# Patient Record
Sex: MALE | Race: WHITE | NOT HISPANIC OR LATINO | Employment: OTHER | URBAN - METROPOLITAN AREA
[De-identification: names, ages, dates, MRNs, and addresses within clinical notes are randomized per-mention and may not be internally consistent; named-entity substitution may affect disease eponyms.]

---

## 2024-01-25 ENCOUNTER — TELEPHONE (OUTPATIENT)
Dept: GASTROENTEROLOGY | Facility: CLINIC | Age: 54
End: 2024-01-25
Payer: COMMERCIAL

## 2024-01-25 NOTE — TELEPHONE ENCOUNTER
----- Message from Yulissa Persaud sent at 1/25/2024  3:53 PM CST -----  Contact: Patient  Patient calling to get appointment for consultation. Patient has a history of reflux and is having issues. Was a previous patient with Dr. Bailey. Matches [facility] call [phone]

## 2024-01-25 NOTE — TELEPHONE ENCOUNTER
Staff returned pt call.. pt advised he's never seen Dr Garcia before and want to make appt.  Staff advised her next available is in Nov 2024 and he will need a referral from his PCP.  He advised he will get w/his PCP

## 2024-02-09 DIAGNOSIS — Z12.11 SCREENING FOR COLON CANCER: Primary | ICD-10-CM

## 2024-03-01 ENCOUNTER — TELEPHONE (OUTPATIENT)
Dept: GASTROENTEROLOGY | Facility: CLINIC | Age: 54
End: 2024-03-01
Payer: COMMERCIAL

## 2024-03-01 NOTE — TELEPHONE ENCOUNTER
----- Message from Calli Guerrier sent at 3/1/2024 11:46 AM CST -----  Type: Appointment Request     Name of Caller: SAMANTHA JOSEPH [[id number]]    When is the first available appointment? Do not have access    Reason for Visit:  Referral scanned in media     Best Call Back Number: [phone]    Additional Information:

## 2024-03-06 ENCOUNTER — TELEPHONE (OUTPATIENT)
Dept: GASTROENTEROLOGY | Facility: CLINIC | Age: 54
End: 2024-03-06
Payer: COMMERCIAL

## 2024-03-06 RX ORDER — IBUPROFEN 800 MG/1
800 TABLET ORAL 2 TIMES DAILY
COMMUNITY
Start: 2023-09-26 | End: 2024-03-25

## 2024-03-06 RX ORDER — PRAVASTATIN SODIUM 40 MG/1
40 TABLET ORAL
COMMUNITY
Start: 2024-01-24

## 2024-03-06 NOTE — TELEPHONE ENCOUNTER
Called patient to direct [facility] patient. Patient stated referral was more for a scope not a colonoscopy. Patient stated he has seen Dr. Bailey and Dr. Hazel in past. There is no record of patient in ed. Patient stated last time he was having trouble swallowing they could not stretch his esophagus. 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 Please advise to direct [facility] patient or have patient come in for apt with np. Also asked patient to come by office to sign shade. 3/6/24 LRA

## 2024-03-06 NOTE — TELEPHONE ENCOUNTER
The referral Dr. Friedman sent was for a colonoscopy. We can request his records once ARTEMIO is signed. Once we receive records, we can review with Dr. Garcia.     TPL review:  EGD w/Dr. Bailey 2017: EBx ulcerative esophagitis w/rare eosinophils.  MLC

## 2024-03-07 ENCOUNTER — TELEPHONE (OUTPATIENT)
Dept: ENDOSCOPY | Facility: HOSPITAL | Age: 54
End: 2024-03-07
Payer: COMMERCIAL

## 2024-03-07 NOTE — TELEPHONE ENCOUNTER
Patient is harinder to come in on 3/25/24 @ 8:00. Told patient to come sign ARTEMIO. Patient will come by the office to sign. 3/7/24 LRA

## 2024-03-07 NOTE — TELEPHONE ENCOUNTER
Okay to schedule next available OV with NP. Have patient sign ARTEMIO for all of [facility] records.    NBP

## 2024-03-07 NOTE — TELEPHONE ENCOUNTER
Spoke to pt. Waiting on records for review. Pt request Dr. Garcia. Pt stated he signed records release today. Pt informed once record received and reviewed, he will be notified with recommendation moving forward. Pt verbalizes understanding.

## 2024-03-08 NOTE — TELEPHONE ENCOUNTER
Patient came by office and signed ARTEMIO. ARTEMIO was faxed to Dr. Bailey/[facility] office. 3/8/24 LRA

## 2024-03-22 NOTE — PROGRESS NOTES
Clinic Note    Reason for visit:  The primary encounter diagnosis was Gastroesophageal reflux disease, unspecified whether esophagitis present. A diagnosis of Screening for colon cancer was also pertinent to this visit.    PCP: Drake Friedman   [address] / [place] [address]    HPI:  This is a 53 y.o. male who is here to establish care. Patient with chronic reflux. He has been on several medications in the past for GERD. He no longer feels any reflux/heartburn. He elevates the HOB at night. He had dysphagia w/solids at one time and had esophageal dilation years ago and has not had issues since that time. No dysphagia currently. No blood in stool or black stools. No current abd pain. No tobacco use. Rarely takes NSAID. His son Gissell also sees Dr. Garcia.     He states last EGD with Dr. Bailey in 2017, he was told esophagus inflamed and was put on medication and the medicine gave him reflux that was severe so he stopped it and has not seen Dr. Bailey since that time. He is not sure what medicine he was given. He has had colonoscopy in the past but unsure if had polyps. No FH of colon cancer.     6/2021 Cologuard neg    Reviewed in TPL. EGD w/Dr. Bailey 2017: EBx ulcerative esophagitis w/rare eosinophils.     Review of Systems   Constitutional:  Negative for diaphoresis, fatigue, fever and unexpected weight change.   HENT:  Negative for hearing loss, mouth sores, postnasal drip, sore throat and trouble swallowing.    Eyes:  Negative for pain, discharge and eye dryness.   Respiratory:  Positive for cough. Negative for apnea, choking, chest tightness, shortness of breath and wheezing.    Cardiovascular:  Negative for chest pain, palpitations and leg swelling.   Gastrointestinal:  Negative for abdominal distention, abdominal pain, anal bleeding, blood in stool, change in bowel habit, constipation, diarrhea, nausea, rectal pain, vomiting, reflux and fecal incontinence.   Genitourinary:  Negative for bladder  "incontinence, dysuria and hematuria.   Musculoskeletal:  Negative for arthralgias, back pain and joint swelling.   Integumentary:  Negative for color change and rash.   Allergic/Immunologic: Negative for environmental allergies and food allergies.   Neurological:  Negative for seizures and headaches.   Hematological:  Negative for adenopathy. Does not bruise/bleed easily.        Past Medical History:   Diagnosis Date    BMI 39.0-39.9,adult     Environmental allergies     Family history of dyslipidemia     GERD (gastroesophageal reflux disease)      Past Surgical History:   Procedure Laterality Date    ADENOIDECTOMY      CYST REMOVAL Right     R Hand    FOOT SURGERY Right 04/2023    TONSILLECTOMY       Family History   Problem Relation Age of Onset    Cancer Maternal Grandmother     Heart disease Maternal Grandfather     Heart disease Paternal Grandmother     Heart disease Paternal Grandfather      Social History     Tobacco Use    Smoking status: Never     Passive exposure: Never    Smokeless tobacco: Never   Substance Use Topics    Alcohol use: Never    Drug use: Never     Review of patient's allergies indicates:  No Known Allergies   Medication List with Changes/Refills   New Medications    SOD SULF-POT CHLORIDE-MAG SULF (SUTAB) 1.479-0.188- 0.225 GRAM TABLET    Take according to package instructions with indicated amount of water. No breakfast day before test. May substitute with Suprep, Clenpiq, Plenvu, Moviprep or GoLytely based on Rx plan and patient preference.   Current Medications    CETIRIZINE (ZYRTEC) 10 MG TABLET    Take 10 mg by mouth once daily. OTC    PRAVASTATIN (PRAVACHOL) 40 MG TABLET    Take 40 mg by mouth.   Discontinued Medications    IBUPROFEN (ADVIL,MOTRIN) 800 MG TABLET    Take 800 mg by mouth 2 (two) times daily.         Vital Signs:  BP (!) 146/95 (BP Location: Left arm, Patient Position: Sitting, BP Method: Large (Automatic))   Pulse 75   Resp 16   Ht 6' 2" (1.88 m)   Wt (!) 139 kg " (306 lb 6.4 oz)   SpO2 97%   BMI 39.34 kg/m²        Physical Exam  Constitutional:       General: He is not in acute distress.     Appearance: Normal appearance. He is well-developed. He is not ill-appearing or toxic-appearing.   HENT:      Head: Normocephalic and atraumatic.      Nose: Nose normal.      Mouth/Throat:      Mouth: Mucous membranes are moist.      Pharynx: Oropharynx is clear. No oropharyngeal exudate or posterior oropharyngeal erythema.   Eyes:      General: Lids are normal. No scleral icterus.        Right eye: No discharge.         Left eye: No discharge.      Conjunctiva/sclera: Conjunctivae normal.   Cardiovascular:      Rate and Rhythm: Normal rate and regular rhythm.      Pulses:           Radial pulses are 2+ on the right side and 2+ on the left side.   Pulmonary:      Effort: Pulmonary effort is normal. No respiratory distress.      Breath sounds: No stridor. No wheezing.   Abdominal:      General: Bowel sounds are normal. There is no distension.      Palpations: Abdomen is soft. There is no fluid wave, hepatomegaly, splenomegaly or mass.      Tenderness: There is no abdominal tenderness. There is no guarding or rebound.   Musculoskeletal:      Cervical back: Full passive range of motion without pain.   Lymphadenopathy:      Cervical: No cervical adenopathy.   Skin:     General: Skin is warm and dry.      Capillary Refill: Capillary refill takes less than 2 seconds.      Coloration: Skin is not cyanotic, jaundiced or pale.   Neurological:      General: No focal deficit present.      Mental Status: He is alert and oriented to person, place, and time.   Psychiatric:         Mood and Affect: Mood normal.         Behavior: Behavior is cooperative.            All of the data above and below has been reviewed by myself and any further interpretations will be reflected in the assessment and plan.   The data includes review of external notes, and independent interpretation of lab results,  procedures, x-rays, and imaging reports.      Assessment:  Gastroesophageal reflux disease, unspecified whether esophagitis present  -     Ambulatory Referral to External Surgery    Screening for colon cancer  -     Ambulatory referral/consult to Gastroenterology  -     Ambulatory Referral to External Surgery  -     sod sulf-pot chloride-mag sulf (SUTAB) 1.479-0.188- 0.225 gram tablet; Take according to package instructions with indicated amount of water. No breakfast day before test. May substitute with Suprep, Clenpiq, Plenvu, Moviprep or GoLytely based on Rx plan and patient preference.  Dispense: 24 tablet; Refill: 0    Ulcerative esophagitis on EGD 2017. No longer feels heartburn but had it for years. Not on acid suppression therapy.  Due for colonoscopy 6/2024.      Recommendations:  Schedule upper and lower endoscopies with Dr. Garcia.     Risks, benefits, and alternatives of medical management, any associated procedures, and/or treatment discussed with the patient. Patient given opportunity to ask questions and voices understanding. Patient has elected to proceed with the recommended care modalities as discussed.    Follow up with Dr. Garcia.     Order summary:  Orders Placed This Encounter   Procedures    Ambulatory Referral to External Surgery        Instructed patient to notify my office if they have not been contacted within two weeks after any procedures, submitting any samples (biopsies, blood, stool, urine, etc.) or after any imaging (X-ray, CT, MRI, etc.).      Conchis Perez NP    This document may have been created using a voice recognition transcribing system. Incorrect words or phrases may have been missed during proofreading. Please interpret accordingly or contact me for clarification.

## 2024-03-25 ENCOUNTER — TELEPHONE (OUTPATIENT)
Dept: GASTROENTEROLOGY | Facility: CLINIC | Age: 54
End: 2024-03-25

## 2024-03-25 ENCOUNTER — OFFICE VISIT (OUTPATIENT)
Dept: GASTROENTEROLOGY | Facility: CLINIC | Age: 54
End: 2024-03-25
Payer: COMMERCIAL

## 2024-03-25 VITALS
RESPIRATION RATE: 16 BRPM | BODY MASS INDEX: 39.32 KG/M2 | SYSTOLIC BLOOD PRESSURE: 146 MMHG | HEIGHT: 74 IN | DIASTOLIC BLOOD PRESSURE: 95 MMHG | WEIGHT: 306.38 LBS | OXYGEN SATURATION: 97 % | HEART RATE: 75 BPM

## 2024-03-25 DIAGNOSIS — Z12.11 SCREENING FOR COLON CANCER: ICD-10-CM

## 2024-03-25 DIAGNOSIS — K21.9 GASTROESOPHAGEAL REFLUX DISEASE, UNSPECIFIED WHETHER ESOPHAGITIS PRESENT: Primary | ICD-10-CM

## 2024-03-25 PROCEDURE — 3080F DIAST BP >= 90 MM HG: CPT | Mod: CPTII,S$GLB,,

## 2024-03-25 PROCEDURE — 3077F SYST BP >= 140 MM HG: CPT | Mod: CPTII,S$GLB,,

## 2024-03-25 PROCEDURE — 1159F MED LIST DOCD IN RCRD: CPT | Mod: CPTII,S$GLB,,

## 2024-03-25 PROCEDURE — 1160F RVW MEDS BY RX/DR IN RCRD: CPT | Mod: CPTII,S$GLB,,

## 2024-03-25 PROCEDURE — 99204 OFFICE O/P NEW MOD 45 MIN: CPT | Mod: S$GLB,,,

## 2024-03-25 PROCEDURE — 3008F BODY MASS INDEX DOCD: CPT | Mod: CPTII,S$GLB,,

## 2024-03-25 RX ORDER — SOD SULF/POT CHLORIDE/MAG SULF 1.479 G
TABLET ORAL
Qty: 24 TABLET | Refills: 0 | Status: SHIPPED | OUTPATIENT
Start: 2024-03-25

## 2024-03-25 RX ORDER — CETIRIZINE HYDROCHLORIDE 10 MG/1
10 TABLET ORAL DAILY
COMMUNITY

## 2024-03-25 NOTE — PATIENT INSTRUCTIONS
Schedule upper and lower endoscopies with Dr. Garcia.       Please notify my office if you have not been contacted within two weeks after any procedures, submitting any samples (biopsies, blood, stool, urine, etc.) or after any imaging (X-ray, CT, MRI, etc.).

## 2024-03-26 NOTE — TELEPHONE ENCOUNTER
----- Message from Meena Garcia MD sent at 3/25/2024  7:21 PM CDT -----  Can await EGD and if active ulcerative esophagitis then he will need PPI BID and repeat EGD. I think that route is fair since he does not have GI Sx anymore. If start PPI first then EGD wnl, then will not know if wnl due to PPI or baseline.  NBP  ----- Message -----  From: Conchis Perez NP  Sent: 3/25/2024   8:57 AM CDT  To: Meena Garcia MD    Start PPI? Asymptomatic.

## 2024-07-25 ENCOUNTER — TELEPHONE (OUTPATIENT)
Dept: GASTROENTEROLOGY | Facility: CLINIC | Age: 54
End: 2024-07-25

## 2024-07-25 ENCOUNTER — OFFICE VISIT (OUTPATIENT)
Dept: GASTROENTEROLOGY | Facility: CLINIC | Age: 54
End: 2024-07-25
Payer: COMMERCIAL

## 2024-07-25 VITALS
BODY MASS INDEX: 39.27 KG/M2 | WEIGHT: 301.81 LBS | DIASTOLIC BLOOD PRESSURE: 94 MMHG | HEART RATE: 65 BPM | HEIGHT: 74 IN | HEIGHT: 74 IN | WEIGHT: 306 LBS | BODY MASS INDEX: 38.73 KG/M2 | SYSTOLIC BLOOD PRESSURE: 159 MMHG | OXYGEN SATURATION: 98 %

## 2024-07-25 DIAGNOSIS — Z12.11 SCREENING FOR COLON CANCER: ICD-10-CM

## 2024-07-25 DIAGNOSIS — K21.9 GASTROESOPHAGEAL REFLUX DISEASE, UNSPECIFIED WHETHER ESOPHAGITIS PRESENT: Primary | ICD-10-CM

## 2024-07-25 PROCEDURE — 99214 OFFICE O/P EST MOD 30 MIN: CPT | Mod: S$GLB,,, | Performed by: INTERNAL MEDICINE

## 2024-07-25 PROCEDURE — 1159F MED LIST DOCD IN RCRD: CPT | Mod: CPTII,S$GLB,, | Performed by: INTERNAL MEDICINE

## 2024-07-25 PROCEDURE — 3077F SYST BP >= 140 MM HG: CPT | Mod: CPTII,S$GLB,, | Performed by: INTERNAL MEDICINE

## 2024-07-25 PROCEDURE — 1160F RVW MEDS BY RX/DR IN RCRD: CPT | Mod: CPTII,S$GLB,, | Performed by: INTERNAL MEDICINE

## 2024-07-25 PROCEDURE — 3080F DIAST BP >= 90 MM HG: CPT | Mod: CPTII,S$GLB,, | Performed by: INTERNAL MEDICINE

## 2024-07-25 PROCEDURE — 3008F BODY MASS INDEX DOCD: CPT | Mod: CPTII,S$GLB,, | Performed by: INTERNAL MEDICINE

## 2024-07-25 NOTE — TELEPHONE ENCOUNTER
Patient was given AVS with apt details. Patient signed ARTEMIO and ARTEMIO was faxed to Dr. Bailey's office. ARTEMIO was uploaded in media manager. 7/25/24 LRA

## 2024-07-25 NOTE — PROGRESS NOTES
Clinic Note    Reason for visit:  The primary encounter diagnosis was Gastroesophageal reflux disease, unspecified whether esophagitis present. A diagnosis of Screening for colon cancer was also pertinent to this visit.    PCP: Drake Friedman       HPI:  This is a 53 y.o. male who is here for a follow up. Patient with chronic reflux. He has been on several medications in the past for GERD. He no longer feels any reflux/heartburn. He elevates the HOB at night. He had dysphagia w/solids at one time and had esophageal dilation years ago and has not had issues since that time. He is scheduled for upper and lower endoscopies on 8/2/2024. No changes since last OV.       He states last EGD with Dr. Bailey in 2017, he was told esophagus inflamed and was put on medication and the medicine gave him reflux that was severe so he stopped it and has not seen Dr. Bailey since that time. He is not sure what medicine he was given. He has had colonoscopy in the past but unsure if had polyps. No FH of colon cancer.      6/2021 Cologuard neg     Reviewed in TPL. EGD w/Dr. Bailey 2017: EBx ulcerative esophagitis w/rare eosinophils.     Review of Systems   Constitutional:  Negative for diaphoresis, fatigue, fever and unexpected weight change.   HENT:  Negative for hearing loss, mouth sores, postnasal drip, sore throat and trouble swallowing.    Eyes:  Negative for pain, discharge and eye dryness.   Respiratory:  Negative for apnea, cough, choking, chest tightness, shortness of breath and wheezing.    Cardiovascular:  Negative for chest pain, palpitations and leg swelling.   Gastrointestinal:  Negative for abdominal distention, abdominal pain, anal bleeding, blood in stool, change in bowel habit, constipation, diarrhea, nausea, rectal pain, vomiting, reflux and fecal incontinence.   Genitourinary:  Negative for bladder incontinence, dysuria and hematuria.   Musculoskeletal:  Negative for arthralgias, back pain and joint swelling.  "  Integumentary:  Negative for color change and rash.   Allergic/Immunologic: Negative for environmental allergies and food allergies.   Neurological:  Negative for seizures and headaches.   Hematological:  Negative for adenopathy. Does not bruise/bleed easily.        Past Medical History:   Diagnosis Date    Environmental allergies     Family history of dyslipidemia     GERD (gastroesophageal reflux disease)     High cholesterol     Obesity, Class II, BMI 35-39.9, isolated (see actual BMI)      Past Surgical History:   Procedure Laterality Date    ADENOIDECTOMY      CYST REMOVAL Right     R Hand    FOOT SURGERY Right 04/2023    TONSILLECTOMY       Family History   Problem Relation Name Age of Onset    Cancer Maternal Grandmother      Heart disease Maternal Grandfather      Heart disease Paternal Grandmother      Heart disease Paternal Grandfather      Colon cancer Neg Hx      Crohn's disease Neg Hx      Esophageal cancer Neg Hx      Liver cancer Neg Hx      Rectal cancer Neg Hx      Stomach cancer Neg Hx      Ulcerative colitis Neg Hx       Social History     Tobacco Use    Smoking status: Never     Passive exposure: Never    Smokeless tobacco: Never   Substance Use Topics    Alcohol use: Never    Drug use: Never     Review of patient's allergies indicates:  No Known Allergies   Medication List with Changes/Refills   Current Medications    CETIRIZINE (ZYRTEC) 10 MG TABLET    Take 10 mg by mouth once daily. OTC    PRAVASTATIN (PRAVACHOL) 40 MG TABLET    Take 40 mg by mouth.   Discontinued Medications    SOD SULF-POT CHLORIDE-MAG SULF (SUTAB) 1.479-0.188- 0.225 GRAM TABLET    Take according to package instructions with indicated amount of water. No breakfast day before test. May substitute with Suprep, Clenpiq, Plenvu, Moviprep or GoLytely based on Rx plan and patient preference.         Vital Signs:  BP (!) 159/94 (BP Location: Left arm, Patient Position: Sitting)   Pulse 65   Ht 6' 2" (1.88 m)   Wt (!) 136.9 kg " (301 lb 12.8 oz)   SpO2 98%   BMI 38.75 kg/m²        Physical Exam  Constitutional:       General: He is not in acute distress.     Appearance: Normal appearance. He is well-developed. He is obese. He is not ill-appearing or toxic-appearing.   HENT:      Head: Normocephalic and atraumatic.      Nose: Nose normal.      Mouth/Throat:      Mouth: Mucous membranes are moist.      Pharynx: Oropharynx is clear. No oropharyngeal exudate or posterior oropharyngeal erythema.   Eyes:      General: Lids are normal. No scleral icterus.        Right eye: No discharge.         Left eye: No discharge.      Conjunctiva/sclera: Conjunctivae normal.   Cardiovascular:      Rate and Rhythm: Normal rate and regular rhythm.      Pulses:           Radial pulses are 2+ on the right side and 2+ on the left side.   Pulmonary:      Effort: Pulmonary effort is normal. No respiratory distress.      Breath sounds: No stridor. No wheezing.   Abdominal:      General: Bowel sounds are normal. There is no distension.      Palpations: Abdomen is soft. There is no fluid wave, hepatomegaly, splenomegaly or mass.      Tenderness: There is no abdominal tenderness. There is no guarding or rebound.   Musculoskeletal:      Cervical back: Full passive range of motion without pain.   Lymphadenopathy:      Cervical: No cervical adenopathy.   Skin:     General: Skin is warm and dry.      Capillary Refill: Capillary refill takes less than 2 seconds.      Coloration: Skin is not cyanotic, jaundiced or pale.   Neurological:      General: No focal deficit present.      Mental Status: He is alert and oriented to person, place, and time.   Psychiatric:         Mood and Affect: Mood normal.         Behavior: Behavior is cooperative.            All of the data above and below has been reviewed by myself and any further interpretations will be reflected in the assessment and plan.   The data includes review of external notes, and independent interpretation of lab  results, procedures, x-rays, and imaging reports.      Assessment:  Gastroesophageal reflux disease, unspecified whether esophagitis present    Screening for colon cancer    Ulcerative esophagitis w/rare eosinophils on EGD 2017. No longer feels heartburn but had it for years. Not on acid suppression therapy. Plan for EGD w/EBx.  Due for colonoscopy. Scheduled 8/2/2024. Has Sutab.  Will request Dr. Leo river.      Recommendations:  Proceed with upper and lower endoscopies on 8/2/2024.     Risks, benefits, and alternatives of medical management, any associated procedures, and/or treatment discussed with the patient. Patient given opportunity to ask questions and voices understanding. Patient has elected to proceed with the recommended care modalities as discussed.    Follow up in about 1 year (around 7/25/2025).with NP    Order summary:  No orders of the defined types were placed in this encounter.     This assessment, plan, and documentation was performed in collaboration with Conchis Perez NP.     Instructed patient to notify my office if they have not been contacted within two weeks after any procedures, submitting any samples (biopsies, blood, stool, urine, etc.) or after any imaging (X-ray, CT, MRI, etc.).      Meena Garcia MD    This document may have been created using a voice recognition transcribing system. Incorrect words or phrases may have been missed during proofreading. Please interpret accordingly or contact me for clarification.

## 2024-07-25 NOTE — PATIENT INSTRUCTIONS
Proceed with upper and lower endoscopies on 8/2/2024.     Please notify my office if you have not been contacted within two weeks after any procedures, submitting any samples (biopsies, blood, stool, urine, etc.) or after any imaging (X-ray, CT, MRI, etc.).

## 2024-07-25 NOTE — LETTER
July 25, 2024        Drake Friedman MD  [address] [place] [address]             [facility]  [address] [place] [address]  Phone: [phone]  Fax: [phone]   Patient: Kike Webster   MR Number: [id number]   YOB: 1970   Date of Visit: 7/25/2024       Dear Dr. Friedman:    Thank you for referring Kike Webster to me for evaluation. Attached you will find relevant portions of my assessment and plan of care.    If you have questions, please do not hesitate to call me. I look forward to following Kike Webster along with you.    Sincerely,      Meena Garcia MD            CC  No Recipients    Enclosure

## 2024-07-25 NOTE — TELEPHONE ENCOUNTER
"[facility]  [address] [place] [address]  Phone: [phone]  Fax: [phone]    History & Physical         Provider: Dr. Meena Garcia    Patient Name: Kike ESPINOSA (age):1970  53 y.o.           Gender: male   Phone: [phone]     Referring Physician: Drake Friedman     Vital Signs:   Height - 6' 2"  Weight - 306 lb  BMI -  39.29    Plan: EGD w/EGBx/Colonoscopy @ [facility]    Encounter Diagnoses   Name Primary?    Gastroesophageal reflux disease, unspecified whether esophagitis present Yes    Screening for colon cancer            History:      Past Medical History:   Diagnosis Date    BMI 39.0-39.9,adult     Environmental allergies     Family history of dyslipidemia     GERD (gastroesophageal reflux disease)       Past Surgical History:   Procedure Laterality Date    ADENOIDECTOMY      CYST REMOVAL Right     R Hand    FOOT SURGERY Right 2023    TONSILLECTOMY        Medication List with Changes/Refills   Current Medications    CETIRIZINE (ZYRTEC) 10 MG TABLET    Take 10 mg by mouth once daily. OTC    PRAVASTATIN (PRAVACHOL) 40 MG TABLET    Take 40 mg by mouth.    SOD SULF-POT CHLORIDE-MAG SULF (SUTAB) 1.479-0.188- 0.225 GRAM TABLET    Take according to package instructions with indicated amount of water. No breakfast day before test. May substitute with Suprep, Clenpiq, Plenvu, Moviprep or GoLytely based on Rx plan and patient preference.      Review of patient's allergies indicates:  No Known Allergies   Family History   Problem Relation Name Age of Onset    Cancer Maternal Grandmother      Heart disease Maternal Grandfather      Heart disease Paternal Grandmother      Heart disease Paternal Grandfather        Social History     Tobacco Use    Smoking status: Never     Passive exposure: Never    Smokeless tobacco: Never   Substance Use Topics    Alcohol use: Never    Drug use: Never    "     Physical Examination:     General Appearance:___________________________  HEENT: _____________________________________  Abdomen:____________________________________  Heart:________________________________________  Lungs:_______________________________________  Extremities:___________________________________  Skin:_________________________________________  Endocrine:____________________________________  Genitourinary:_________________________________  Neurological:__________________________________      Patient has been evaluated immediately prior to sedation and is medically cleared for endoscopy with IVCS as an ASA class: ______      Physician Signature: _________________________       Date: ________  Time: ________

## 2024-08-02 ENCOUNTER — OUTSIDE PLACE OF SERVICE (OUTPATIENT)
Dept: GASTROENTEROLOGY | Facility: CLINIC | Age: 54
End: 2024-08-02

## 2024-08-02 LAB — CRC RECOMMENDATION EXT: NORMAL

## 2024-08-11 ENCOUNTER — TELEPHONE (OUTPATIENT)
Dept: GASTROENTEROLOGY | Facility: CLINIC | Age: 54
End: 2024-08-11
Payer: COMMERCIAL

## 2024-08-11 DIAGNOSIS — K20.90 ESOPHAGITIS: Primary | ICD-10-CM

## 2024-08-11 DIAGNOSIS — R13.10 DYSPHAGIA, UNSPECIFIED TYPE: ICD-10-CM

## 2024-08-11 RX ORDER — PANTOPRAZOLE SODIUM 40 MG/1
40 TABLET, DELAYED RELEASE ORAL 2 TIMES DAILY
Qty: 180 TABLET | Refills: 0 | Status: SHIPPED | OUTPATIENT
Start: 2024-08-11 | End: 2024-08-16

## 2024-08-12 NOTE — TELEPHONE ENCOUNTER
GBx wnl w/o Hp, EGBx reflux, distEBx reflux, midEBx nl, 5 TA, 2 hyp, repeat colonoscopy w/adult colonoscope in 3 years.     Notify patient. No infection on his stomach Bx. He esophageal Bx did not confirm BE but did show reflux changes.  His colon polyps were benign. Repeat colonoscopy in 3 years.  Confirm recall tab in appointment desk is up-to-date (update if needed). For his reflux, I rec panto 40 BID x2 months with repeat EGD w/EBx +/- dilation. Add on to Thursday 10/10/2024 procedure schedule for EGD. Panto eRx sent to his pharmacy. Request all Bailey records (I do not see them after requested twice, confirm I get them.).  NBP

## 2024-08-13 NOTE — TELEPHONE ENCOUNTER
Called Dr. Bailey's office, left a VM on Nurse Gabi's line informing her that   We are attempting to get records for the patient and this is our third request.    Request and signed ARTEMIO faxed to Dr. Bailey's office at [phone] with fax confirmation. -KNC, LPN

## 2024-08-13 NOTE — TELEPHONE ENCOUNTER
Returned call to patient, gave him results per Dr. Garcia. Patient was informed of  No infection on his stomach Bx. He esophageal Bx did not confirm BE but did show reflux changes. His colon polyps were benign. Patient was informed of repeat colonoscopy in 3 years.     His Recall tab is up to date in chart for Colonoscopy.    Patient was informed of Panto rx that was sent to pharmacy and pt was directed to take 40mg BID for 2 months. Patient also informed that Dr. Garcia wants patient to have a repeat EGD on Thursday 10/10/2024.    Patient was reminded of CLD the day before procedure, NPO @ midnight and to pre-register with the hospital 30 days before.    EGD Prep Instructions emailed to [email or web address]    Could not schedule patient in [facility] for EGD on 10/10/24. Message was sent via Teams in the GI Scheduling Thread to be added.     Per Kelly, Procedure will need a PA. Will await for approval to fax orders to  Central Scheduling.    Will send ARTEMIO and request records from Dr. Bailey's office  -[facility] [place]

## 2024-08-15 NOTE — TELEPHONE ENCOUNTER
Returned call to patient, he stated that he has taken 2 doses of his pantoprazole and after each does he has had severe reflux. Patient stated that he was started on Pantoprazole by Dr. Bailey in 2017 and the exact same thing has happened.     *Records from Dr. Bailey scanned into chart*     Patient is requesting a change in script.  His pharmacy is [facility] in [place].     Patient was informed that I had to speak with providers then I would return his call. Patient verbalized understanding of this information. -ED,LPN

## 2024-08-15 NOTE — TELEPHONE ENCOUNTER
Kalyani Land Staff  Caller: pt (Today,  9:02 AM)  Pt calling about medication for pantoprazole (PROTONIX) 40 MG giving pt acid reflex and would like to change script, but would like to speak w/someone first.   He can be reached at [phone].  Pt      [id number] - [place], [place] - [address] AT [facility]  [address]  [place] [address]  Phone: [phone] Fax: [phone]        Thanks,

## 2024-08-16 RX ORDER — ESOMEPRAZOLE MAGNESIUM 40 MG/1
40 GRANULE, DELAYED RELEASE ORAL
Qty: 90 EACH | Refills: 3 | Status: SHIPPED | OUTPATIENT
Start: 2024-08-16 | End: 2025-08-16

## 2024-08-18 NOTE — PROGRESS NOTES
12/15/2017 Bailey EGD: Anil, HH, reflux/ulcers, lower esoph str not dil due to ulcers. Brownleerd 2007 EGD.  NBP

## 2024-08-20 ENCOUNTER — PATIENT MESSAGE (OUTPATIENT)
Dept: GASTROENTEROLOGY | Facility: CLINIC | Age: 54
End: 2024-08-20
Payer: COMMERCIAL

## 2024-08-20 DIAGNOSIS — K20.90 ESOPHAGITIS: Primary | ICD-10-CM

## 2024-08-20 RX ORDER — ESOMEPRAZOLE MAGNESIUM 40 MG/1
40 CAPSULE, DELAYED RELEASE ORAL DAILY
Qty: 90 CAPSULE | Refills: 3 | Status: SHIPPED | OUTPATIENT
Start: 2024-08-20 | End: 2024-08-20

## 2024-08-20 RX ORDER — ESOMEPRAZOLE MAGNESIUM 40 MG/1
40 CAPSULE, DELAYED RELEASE ORAL 2 TIMES DAILY
Qty: 180 CAPSULE | Refills: 3 | Status: SHIPPED | OUTPATIENT
Start: 2024-08-20

## 2024-08-20 NOTE — TELEPHONE ENCOUNTER
He may try taking Nexium 20 mg OTC and take 2 tablets twice daily while we wait for approval with insurance since he is leaving for vacation. Also, ANNI sent granule form of medication and it should be capsule form. I resent the capsule form to the pharmacy. Not sure if this was why it was denied.  MLC

## 2024-08-21 ENCOUNTER — TELEPHONE (OUTPATIENT)
Dept: GASTROENTEROLOGY | Facility: CLINIC | Age: 54
End: 2024-08-21
Payer: COMMERCIAL

## 2024-08-21 NOTE — TELEPHONE ENCOUNTER
Called [phone]. No option for peer to peer. Submitted new PA over phone and received approval.  MLC

## 2024-08-22 NOTE — TELEPHONE ENCOUNTER
Returned call. Automated service. It just said case approved. Approval #[id number]. Exp 11/19/2024. Assuming this is what [facility]'s note is about. DMP

## 2024-08-22 NOTE — TELEPHONE ENCOUNTER
Message  Received: Yesterday   Pt Advice  Tonia Matos Staff  [phone] case#[id number] please RTC

## 2024-09-03 NOTE — TELEPHONE ENCOUNTER
Not sure if he meant he never got the Nexium capsules? If still with significant reflux, may come by and get Voquezna samples to try.  Ok to give Voquezna 20mg (2 boxes). May take once daily.  KN

## 2024-09-04 NOTE — TELEPHONE ENCOUNTER
Messsage sent to patient on the portal. Nikki GUNTER notified to log out 2 sample boxes of Voquenza 20 mg. DMP

## 2024-09-10 ENCOUNTER — DOCUMENTATION ONLY (OUTPATIENT)
Dept: GASTROENTEROLOGY | Facility: CLINIC | Age: 54
End: 2024-09-10
Payer: COMMERCIAL

## 2024-09-20 ENCOUNTER — PATIENT MESSAGE (OUTPATIENT)
Dept: GASTROENTEROLOGY | Facility: CLINIC | Age: 54
End: 2024-09-20
Payer: COMMERCIAL

## 2024-09-20 DIAGNOSIS — K21.00 GASTROESOPHAGEAL REFLUX DISEASE WITH ESOPHAGITIS WITHOUT HEMORRHAGE: Primary | ICD-10-CM

## 2024-09-20 RX ORDER — VONOPRAZAN FUMARATE 26.72 MG/1
20 TABLET ORAL DAILY
Qty: 30 TABLET | Refills: 1 | Status: SHIPPED | OUTPATIENT
Start: 2024-09-20

## 2024-09-20 NOTE — TELEPHONE ENCOUNTER
Let him know that I will send Voquezna 20 mg daily to [facility]. It's a pharmacy that the drug company works with to help with cost savings and access to the medication. I sent the prescription electronically. He will receive a call or text from a [employer] agent with a link to his profile. [employer] will determine insurance coverage and any savings the he may be eligible for. He will confirm his delivery address etc through his profile so the medication can be shipped to him. While we are waiting for him to receive the medication, he may  another bottle of samples from our office if we have them. Should be 20 mg tablets.     Yes, we will need to postpone the EGD some. Can he reschedule EGD to 11/6/2024? Also, let him know that I was able to get the EGD approved after speaking to his insurance. The approval expires on 11/19/2024 so we just need to have his EGD done before that time.      On Voquezna since 9/16/2024. EGD is approved until 11/19/2024. EGD currently scheduled 10/10/2024.   [facility]

## 2024-09-22 NOTE — TELEPHONE ENCOUNTER
Patient agreed to Wednesday 11/6/2024 for EGD. Updated order faxed to central scheduling with authorization #[id number]. Epic schedule updated. Did receive PA request for Voquezna via Epic and [facility]. Will hold off on completing per MLC- will wait to see if we need to do it or if [employer] will.    Patient did not specify if he will come to get samples or not. Will need someone to log them out for me/see if we have them in stock. DMP

## 2024-09-24 NOTE — TELEPHONE ENCOUNTER
PA for Voquezna 20 mg approved. Exp 6/24/2025. Approval letter added to patient's chart. Patient notified via portal. Reminder set to notify me if not read by tomorrow. DMP

## 2024-10-30 ENCOUNTER — TELEPHONE (OUTPATIENT)
Dept: GASTROENTEROLOGY | Facility: CLINIC | Age: 54
End: 2024-10-30
Payer: COMMERCIAL

## 2024-10-30 VITALS — BODY MASS INDEX: 38.63 KG/M2 | WEIGHT: 301 LBS | HEIGHT: 74 IN

## 2024-10-30 DIAGNOSIS — K20.90 ESOPHAGITIS: Primary | ICD-10-CM

## 2024-10-30 DIAGNOSIS — R13.10 DYSPHAGIA, UNSPECIFIED TYPE: ICD-10-CM

## 2024-11-06 ENCOUNTER — OUTSIDE PLACE OF SERVICE (OUTPATIENT)
Dept: GASTROENTEROLOGY | Facility: CLINIC | Age: 54
End: 2024-11-06

## 2024-11-08 ENCOUNTER — TELEPHONE (OUTPATIENT)
Dept: GASTROENTEROLOGY | Facility: CLINIC | Age: 54
End: 2024-11-08
Payer: COMMERCIAL

## 2024-11-08 DIAGNOSIS — K22.10 EROSIVE ESOPHAGITIS: ICD-10-CM

## 2024-11-08 DIAGNOSIS — K20.90 ESOPHAGITIS: ICD-10-CM

## 2024-11-08 DIAGNOSIS — K21.00 GASTROESOPHAGEAL REFLUX DISEASE WITH ESOPHAGITIS WITHOUT HEMORRHAGE: ICD-10-CM

## 2024-11-09 RX ORDER — PANTOPRAZOLE SODIUM 40 MG/1
40 TABLET, DELAYED RELEASE ORAL 2 TIMES DAILY
Qty: 180 TABLET | Refills: 0 | OUTPATIENT
Start: 2024-11-09

## 2024-11-09 NOTE — TELEPHONE ENCOUNTER
EGBx nl, EBx nl.    Notify patient that his esophageal Bx looked well. No signs of BE or active inflammation on the Voquezna 20mg daily. I rec he decrease his dose to 10mg daily. A new eRx has been sent. He may try stopping the medicine all together after two months of 10mg tablets.  If any upper GI Sx recur, then he may need to continue the medicine and we can work to attempt to decrease the frequency as much as possible.  NBP

## 2024-11-13 ENCOUNTER — PATIENT MESSAGE (OUTPATIENT)
Dept: RESEARCH | Facility: HOSPITAL | Age: 54
End: 2024-11-13
Payer: COMMERCIAL

## 2024-11-14 NOTE — TELEPHONE ENCOUNTER
Bia Leyva Staff  Caller: Unspecified (Yesterday,  3:18 PM)  Type:  Patient Returning Call    Who Called:pt  Who Left Message for Patient:na  Does the patient know what this is regarding?:results  Would the patient rather a call back or a response via [employer]? call  Best Call Back Number:[phone]    Additional Information: returning missed call    11/14/24 4:00 PM    Returned pt call and went over NBP chart remarks on 11/9/24. Pt mentioned that he is not having any symptoms, but will decrease his dose to 10 mg and then try stopping the medicine all together after two months, per NBP recommendation. Pt acknowledge he understood. marcus

## 2025-07-25 ENCOUNTER — OFFICE VISIT (OUTPATIENT)
Dept: GASTROENTEROLOGY | Facility: CLINIC | Age: 55
End: 2025-07-25
Payer: COMMERCIAL

## 2025-07-25 VITALS
BODY MASS INDEX: 34.93 KG/M2 | HEIGHT: 74 IN | HEART RATE: 73 BPM | SYSTOLIC BLOOD PRESSURE: 136 MMHG | DIASTOLIC BLOOD PRESSURE: 87 MMHG | WEIGHT: 272.19 LBS | OXYGEN SATURATION: 98 %

## 2025-07-25 DIAGNOSIS — R13.10 DYSPHAGIA, UNSPECIFIED TYPE: ICD-10-CM

## 2025-07-25 DIAGNOSIS — Z86.0100 HISTORY OF COLON POLYPS: ICD-10-CM

## 2025-07-25 DIAGNOSIS — Z87.11 HISTORY OF PEPTIC ULCER DISEASE: ICD-10-CM

## 2025-07-25 DIAGNOSIS — K21.00 GASTROESOPHAGEAL REFLUX DISEASE WITH ESOPHAGITIS WITHOUT HEMORRHAGE: Primary | ICD-10-CM

## 2025-07-25 NOTE — LETTER
July 25, 2025        Drake Friedman MD  [address] [place] [address]             [place] [facility]  [address] [place] [address]  Phone: [phone]  Fax: [phone]   Patient: Kike Webster   MR Number: [id number]   YOB: 1970   Date of Visit: 7/25/2025       Dear Dr. Friedman:    Thank you for referring Kike Webster to me for evaluation. Attached you will find relevant portions of my assessment and plan of care.    If you have questions, please do not hesitate to call me. I look forward to following Kike Webster along with you.    Sincerely,      Conchis Perez, NP            CC  No Recipients    Enclosure

## 2025-07-25 NOTE — PROGRESS NOTES
Clinic Note    Reason for visit:  The primary encounter diagnosis was Gastroesophageal reflux disease with esophagitis without hemorrhage. Diagnoses of Dysphagia, unspecified type, History of peptic ulcer disease, and History of colon polyps were also pertinent to this visit.    PCP: Drake Friedman       HPI:  This is a 54 y.o. male who is here for a follow up. Patient with GERD with h/o esophagitis and stricture. He doesn't feel heartburn. All reflux medications have caused symptoms except for Voquezna. His dysphagia also improved/resolved with Voquezna 20 mg daily. He was to decrease dose to 10 mg daily then stop after 2 months. He has been off Voquezna since 2/2025 or so. He has not had any issues since then. No dysphagia or reflux. He has lost 40# intentionally. Doing macro fit.       EGD 11/6/2024: Patchy distal esophageal scarring, patent esophagogastric junction stricture, medium sliding HH. EGBx nl, EBx nl. On Voquezna 20 mg daily.     EGD/Colonoscopy 8/2/2024: Small HH, GBx wnl w/o Hp, EGBx reflux, distEBx reflux, midEBx nl, 5 TA, 2 hyp, long redundant colon, sigmoid diverticulosis, IH. Repeat colonoscopy w/adult colonoscope in 3 years. I rec panto 40 BID x2 months with repeat EGD w/EBx +/- dilation.    12/15/2017 Bailey EGD: Anil, HH, reflux/ulcers, lower esoph str not dil due to ulcers. Brownleerd 2007 EGD.       Review of Systems   Constitutional:  Negative for diaphoresis, fatigue, fever and unexpected weight change.   HENT:  Negative for hearing loss, mouth sores, postnasal drip, sore throat and trouble swallowing.    Eyes:  Negative for pain, discharge and eye dryness.   Respiratory:  Negative for apnea, cough, choking, chest tightness, shortness of breath and wheezing.    Cardiovascular:  Negative for chest pain, palpitations and leg swelling.   Gastrointestinal:  Negative for abdominal distention, abdominal pain, anal bleeding, blood in stool, change in bowel habit, constipation, diarrhea, nausea, rectal  "pain, vomiting, reflux and fecal incontinence.   Genitourinary:  Negative for bladder incontinence, dysuria and hematuria.   Musculoskeletal:  Negative for arthralgias, back pain and joint swelling.   Integumentary:  Negative for color change and rash.   Allergic/Immunologic: Negative for environmental allergies and food allergies.   Neurological:  Negative for seizures and headaches.   Hematological:  Negative for adenopathy. Does not bruise/bleed easily.        Past Medical History:   Diagnosis Date    Environmental allergies     Family history of dyslipidemia     GERD (gastroesophageal reflux disease)     High cholesterol     History of colonic polyps     Obesity, Class I, BMI 30.0-34.9 (see actual BMI)      Past Surgical History:   Procedure Laterality Date    ADENOIDECTOMY      CYST REMOVAL Right     R Hand    FOOT SURGERY Right 04/2023    TONSILLECTOMY       Family History   Problem Relation Name Age of Onset    Cancer Maternal Grandmother      Heart disease Maternal Grandfather      Heart disease Paternal Grandmother      Heart disease Paternal Grandfather      Colon cancer Neg Hx      Crohn's disease Neg Hx      Esophageal cancer Neg Hx      Liver cancer Neg Hx      Rectal cancer Neg Hx      Stomach cancer Neg Hx      Ulcerative colitis Neg Hx       Social History[1]  Review of patient's allergies indicates:  No Known Allergies   Medication List with Changes/Refills   Current Medications    CETIRIZINE (ZYRTEC) 10 MG TABLET    Take 10 mg by mouth once daily. OTC    PRAVASTATIN (PRAVACHOL) 40 MG TABLET    Take 40 mg by mouth.         Vital Signs:  /87 (BP Location: Left arm, Patient Position: Sitting)   Pulse 73   Ht 6' 2" (1.88 m)   Wt 123.5 kg (272 lb 3.2 oz)   SpO2 98%   BMI 34.95 kg/m²        Physical Exam  Constitutional:       General: He is not in acute distress.     Appearance: Normal appearance. He is well-developed. He is not ill-appearing or toxic-appearing.   HENT:      Head: " Normocephalic and atraumatic.      Nose: Nose normal.      Mouth/Throat:      Mouth: Mucous membranes are moist.      Pharynx: Oropharynx is clear. No oropharyngeal exudate or posterior oropharyngeal erythema.   Eyes:      General: Lids are normal. No scleral icterus.        Right eye: No discharge.         Left eye: No discharge.      Conjunctiva/sclera: Conjunctivae normal.   Cardiovascular:      Rate and Rhythm: Normal rate and regular rhythm.      Pulses:           Radial pulses are 2+ on the right side and 2+ on the left side.   Pulmonary:      Effort: Pulmonary effort is normal. No respiratory distress.      Breath sounds: No stridor. No wheezing.   Abdominal:      General: Bowel sounds are normal. There is no distension.      Palpations: Abdomen is soft. There is no fluid wave, hepatomegaly, splenomegaly or mass.      Tenderness: There is no abdominal tenderness. There is no guarding or rebound.   Lymphadenopathy:      Cervical: No cervical adenopathy.   Skin:     General: Skin is warm and dry.      Capillary Refill: Capillary refill takes less than 2 seconds.      Coloration: Skin is not cyanotic, jaundiced or pale.   Neurological:      General: No focal deficit present.      Mental Status: He is alert and oriented to person, place, and time.   Psychiatric:         Mood and Affect: Mood normal.         Behavior: Behavior is cooperative.            All of the data above and below has been reviewed by myself and any further interpretations will be reflected in the assessment and plan.   The data includes review of external notes, and independent interpretation of lab results, procedures, x-rays, and imaging reports.      Assessment:  Gastroesophageal reflux disease with esophagitis without hemorrhage    Dysphagia, unspecified type    History of peptic ulcer disease    History of colon polyps    Colon due w/adult colonoscope in 2027.  H/o esophagitis and stricture which improved with a course of Voquezna 20 mg.  Repeat EGD looked well. He decreased Voquezna to 10 mg then off completely and doing well since that time. May consider repeat EGD in future.   If has symptoms in future, we will plan for 8 weeks of 20 mg then 10 mg x 2 months then off again.  Had more upper GI symptoms with PPIs.    Recommendations:    Notify my office if you have any trouble swallowing or any reflux/heartburn.     If any tests, procedures, or imaging has been ordered and you are not contacted to schedule within 1-2 weeks, then you may call the central scheduling department directly at [phone].     Risks, benefits, and alternatives of medical management, any associated procedures, and/or treatment discussed with the patient. Patient given opportunity to ask questions and voices understanding. Patient has elected to proceed with the recommended care modalities as discussed.    Follow up in about 2 years (around 7/25/2027).    Order summary:  No orders of the defined types were placed in this encounter.         Instructed patient to notify my office if they have not been contacted within two weeks after any procedures, submitting any samples (biopsies, blood, stool, urine, etc.) or after any imaging (X-ray, CT, MRI, etc.).      Conchis Perez NP    This document may have been created using a voice recognition transcribing system. Incorrect words or phrases may have been missed during proofreading. Please interpret accordingly or contact me for clarification.          [1]   Social History  Tobacco Use    Smoking status: Never     Passive exposure: Never    Smokeless tobacco: Never   Substance Use Topics    Alcohol use: Not Currently    Drug use: Not Currently